# Patient Record
Sex: FEMALE | HISPANIC OR LATINO | Employment: FULL TIME | URBAN - METROPOLITAN AREA
[De-identification: names, ages, dates, MRNs, and addresses within clinical notes are randomized per-mention and may not be internally consistent; named-entity substitution may affect disease eponyms.]

---

## 2021-12-21 ENCOUNTER — OFFICE VISIT (OUTPATIENT)
Dept: OBSTETRICS AND GYNECOLOGY | Facility: CLINIC | Age: 51
End: 2021-12-21

## 2021-12-21 VITALS
BODY MASS INDEX: 24.16 KG/M2 | SYSTOLIC BLOOD PRESSURE: 113 MMHG | DIASTOLIC BLOOD PRESSURE: 74 MMHG | HEIGHT: 65 IN | HEART RATE: 91 BPM | WEIGHT: 145 LBS

## 2021-12-21 DIAGNOSIS — N83.299 FUNCTIONAL CYST OF OVARY: Primary | ICD-10-CM

## 2021-12-21 PROCEDURE — 99203 OFFICE O/P NEW LOW 30 MIN: CPT | Performed by: OBSTETRICS & GYNECOLOGY

## 2021-12-21 NOTE — PROGRESS NOTES
SUBJECTIVE:   Chief Complaint   Patient presents with   • Follow-up     here for follow up to scan showing bilateral adnexal masses.       Patient was offered interpretor, but declined at this visit. She was counseled that she can notify us at any time if she prefers an interpretor and expressed agreement and understanding    Edna King is a 51 y.o.  who presents for her first visit with us. She reports she was seen at [facility] due to some microscopic hematuria.  She had a CT scan in Oct which showed two small cysts (18 and 11mm).  She then had an ultrasound 3 weeks ago.  She does not recall the location of the [place], but does [place] was [place]. She goes to [facility] for primary care.  Still having regular periods every month. Denies personal or family history of ovarian cancer, breast cancer, uterine cancer, colon cancer.  She has no bloating, pain or other pelvic symptoms. Reports pap smears have all been normal, last was at [facility] 3 months ago per her report.    History reviewed. No pertinent past medical history.  Past Surgical History:   Procedure Laterality Date   • DILATION AND CURETTAGE, DIAGNOSTIC / THERAPEUTIC       OB History    Para Term  AB Living   3 2 2   1 2   SAB IAB Ectopic Molar Multiple Live Births   1                # Outcome Date GA Lbr Moiz/2nd Weight Sex Delivery Anes PTL Lv   3 SAB            2 Term            1 Term               Social History     Tobacco Use   • Smoking status: Never Smoker   • Smokeless tobacco: Never Used   Substance Use Topics   • Alcohol use: Yes     Comment: sometimes   • Drug use: Never     Family History   Problem Relation Age of Onset   • Heart disease Maternal Grandmother    • Throat cancer Maternal Grandfather    • Liver cancer Maternal Aunt    • Stomach cancer Maternal Uncle      Current Outpatient Medications on File Prior to Visit   Medication Sig Dispense Refill   • vitamin D3 125 MCG (5000 UT) capsule  "capsule Take 5,000 Units by mouth Daily.       No current facility-administered medications on file prior to visit.     No Known Allergies     Review of Systems  See HPI    OBJECTIVE:   Vitals:    12/21/21 1432   BP: 113/74   Pulse: 91   Weight: 65.8 kg (145 lb)   Height: 165.1 cm (65\")      Physical Exam  Constitutional:       General: She is not in acute distress.     Appearance: She is well-developed. She is not diaphoretic.   HENT:      Head: Normocephalic and atraumatic.   Eyes:      General: No scleral icterus.     Extraocular Movements: Extraocular movements intact.   Pulmonary:      Effort: Pulmonary effort is normal. No respiratory distress.   Skin:     General: Skin is warm and dry.   Neurological:      General: No focal deficit present.      Mental Status: She is alert and oriented to person, place, and time.   Psychiatric:         Mood and Affect: Mood normal.         Behavior: Behavior normal.         Thought Content: Thought content normal.         Judgment: Judgment normal.         ASSESSMENT/PLAN:     ICD-10-CM ICD-9-CM   1. Functional cyst of ovary  N83.299 620.2   Made multiple requests but were only able to receive CT scan from October during her visit. We will see if her PCP has her ultrasound and pap smear results.  She was offered an ultrasound today but declined as she just recently had one. I reviewed that based on the size and description of the CT Scan these are likely functional cysts which would be normal in a menstruating woman.  She was encouraged to call with any new symptoms and to continue regular pap smears/mammograms.  She agrees.    No orders of the defined types were placed in this encounter.      No follow-ups on file.            "